# Patient Record
Sex: FEMALE | Race: WHITE | NOT HISPANIC OR LATINO | Employment: OTHER | URBAN - METROPOLITAN AREA
[De-identification: names, ages, dates, MRNs, and addresses within clinical notes are randomized per-mention and may not be internally consistent; named-entity substitution may affect disease eponyms.]

---

## 2017-03-06 ENCOUNTER — APPOINTMENT (OUTPATIENT)
Dept: LAB | Facility: CLINIC | Age: 72
End: 2017-03-06
Payer: MEDICARE

## 2017-03-06 DIAGNOSIS — C50.411 MALIGNANT NEOPLASM OF UPPER-OUTER QUADRANT OF RIGHT FEMALE BREAST (HCC): ICD-10-CM

## 2017-03-06 LAB
ALBUMIN SERPL BCP-MCNC: 3.9 G/DL (ref 3.5–5)
ALP SERPL-CCNC: 136 U/L (ref 46–116)
ALT SERPL W P-5'-P-CCNC: 28 U/L (ref 12–78)
ANION GAP SERPL CALCULATED.3IONS-SCNC: 6 MMOL/L (ref 4–13)
AST SERPL W P-5'-P-CCNC: 14 U/L (ref 5–45)
BASOPHILS # BLD AUTO: 0.04 THOUSANDS/ΜL (ref 0–0.1)
BASOPHILS NFR BLD AUTO: 1 % (ref 0–1)
BILIRUB SERPL-MCNC: 0.44 MG/DL (ref 0.2–1)
BUN SERPL-MCNC: 15 MG/DL (ref 5–25)
CALCIUM SERPL-MCNC: 10 MG/DL (ref 8.3–10.1)
CANCER AG27-29 SERPL-ACNC: 16.8 U/ML (ref 0–42.3)
CHLORIDE SERPL-SCNC: 103 MMOL/L (ref 100–108)
CO2 SERPL-SCNC: 31 MMOL/L (ref 21–32)
CREAT SERPL-MCNC: 0.71 MG/DL (ref 0.6–1.3)
EOSINOPHIL # BLD AUTO: 0.07 THOUSAND/ΜL (ref 0–0.61)
EOSINOPHIL NFR BLD AUTO: 1 % (ref 0–6)
ERYTHROCYTE [DISTWIDTH] IN BLOOD BY AUTOMATED COUNT: 14.2 % (ref 11.6–15.1)
GFR SERPL CREATININE-BSD FRML MDRD: >60 ML/MIN/1.73SQ M
GLUCOSE SERPL-MCNC: 96 MG/DL (ref 65–140)
HCT VFR BLD AUTO: 41.2 % (ref 34.8–46.1)
HGB BLD-MCNC: 14.1 G/DL (ref 11.5–15.4)
LYMPHOCYTES # BLD AUTO: 2.51 THOUSANDS/ΜL (ref 0.6–4.47)
LYMPHOCYTES NFR BLD AUTO: 45 % (ref 14–44)
MCH RBC QN AUTO: 31.4 PG (ref 26.8–34.3)
MCHC RBC AUTO-ENTMCNC: 34.2 G/DL (ref 31.4–37.4)
MCV RBC AUTO: 92 FL (ref 82–98)
MONOCYTES # BLD AUTO: 0.53 THOUSAND/ΜL (ref 0.17–1.22)
MONOCYTES NFR BLD AUTO: 10 % (ref 4–12)
NEUTROPHILS # BLD AUTO: 2.39 THOUSANDS/ΜL (ref 1.85–7.62)
NEUTS SEG NFR BLD AUTO: 43 % (ref 43–75)
NRBC BLD AUTO-RTO: 0 /100 WBCS
PLATELET # BLD AUTO: 267 THOUSANDS/UL (ref 149–390)
PMV BLD AUTO: 12.1 FL (ref 8.9–12.7)
POTASSIUM SERPL-SCNC: 4.4 MMOL/L (ref 3.5–5.3)
PROT SERPL-MCNC: 7.6 G/DL (ref 6.4–8.2)
RBC # BLD AUTO: 4.49 MILLION/UL (ref 3.81–5.12)
SODIUM SERPL-SCNC: 140 MMOL/L (ref 136–145)
WBC # BLD AUTO: 5.55 THOUSAND/UL (ref 4.31–10.16)

## 2017-03-06 PROCEDURE — 85025 COMPLETE CBC W/AUTO DIFF WBC: CPT

## 2017-03-06 PROCEDURE — 86300 IMMUNOASSAY TUMOR CA 15-3: CPT

## 2017-03-06 PROCEDURE — 80053 COMPREHEN METABOLIC PANEL: CPT

## 2017-03-06 PROCEDURE — 36415 COLL VENOUS BLD VENIPUNCTURE: CPT

## 2017-03-16 ENCOUNTER — ALLSCRIPTS OFFICE VISIT (OUTPATIENT)
Dept: OTHER | Facility: OTHER | Age: 72
End: 2017-03-16

## 2017-03-21 ENCOUNTER — ALLSCRIPTS OFFICE VISIT (OUTPATIENT)
Dept: OTHER | Facility: OTHER | Age: 72
End: 2017-03-21

## 2017-03-21 ENCOUNTER — TRANSCRIBE ORDERS (OUTPATIENT)
Dept: ADMINISTRATIVE | Facility: HOSPITAL | Age: 72
End: 2017-03-21

## 2017-03-21 DIAGNOSIS — C50.411 MALIGNANT NEOPLASM OF UPPER-OUTER QUADRANT OF RIGHT FEMALE BREAST (HCC): Primary | ICD-10-CM

## 2017-06-28 ENCOUNTER — HOSPITAL ENCOUNTER (OUTPATIENT)
Dept: MAMMOGRAPHY | Facility: CLINIC | Age: 72
Discharge: HOME | End: 2017-06-28
Payer: MEDICARE

## 2017-06-28 DIAGNOSIS — C50.411 MALIGNANT NEOPLASM OF UPPER-OUTER QUADRANT OF RIGHT FEMALE BREAST (HCC): ICD-10-CM

## 2017-06-28 PROCEDURE — G0279 TOMOSYNTHESIS, MAMMO: HCPCS

## 2017-06-28 PROCEDURE — G0206 DX MAMMO INCL CAD UNI: HCPCS

## 2017-09-06 DIAGNOSIS — C50.411 MALIGNANT NEOPLASM OF UPPER-OUTER QUADRANT OF RIGHT FEMALE BREAST (HCC): ICD-10-CM

## 2017-09-08 ENCOUNTER — APPOINTMENT (OUTPATIENT)
Dept: LAB | Facility: CLINIC | Age: 72
End: 2017-09-08
Payer: MEDICARE

## 2017-09-08 DIAGNOSIS — C50.411 MALIGNANT NEOPLASM OF UPPER-OUTER QUADRANT OF RIGHT FEMALE BREAST (HCC): ICD-10-CM

## 2017-09-08 LAB
ALBUMIN SERPL BCP-MCNC: 3.9 G/DL (ref 3.5–5)
ALP SERPL-CCNC: 107 U/L (ref 46–116)
ALT SERPL W P-5'-P-CCNC: 26 U/L (ref 12–78)
ANION GAP SERPL CALCULATED.3IONS-SCNC: 7 MMOL/L (ref 4–13)
AST SERPL W P-5'-P-CCNC: 17 U/L (ref 5–45)
BASOPHILS # BLD AUTO: 0.03 THOUSANDS/ΜL (ref 0–0.1)
BASOPHILS NFR BLD AUTO: 1 % (ref 0–1)
BILIRUB SERPL-MCNC: 0.52 MG/DL (ref 0.2–1)
BUN SERPL-MCNC: 20 MG/DL (ref 5–25)
CALCIUM SERPL-MCNC: 9.9 MG/DL (ref 8.3–10.1)
CANCER AG27-29 SERPL-ACNC: 19.9 U/ML (ref 0–42.3)
CHLORIDE SERPL-SCNC: 105 MMOL/L (ref 100–108)
CO2 SERPL-SCNC: 28 MMOL/L (ref 21–32)
CREAT SERPL-MCNC: 0.73 MG/DL (ref 0.6–1.3)
EOSINOPHIL # BLD AUTO: 0.06 THOUSAND/ΜL (ref 0–0.61)
EOSINOPHIL NFR BLD AUTO: 1 % (ref 0–6)
ERYTHROCYTE [DISTWIDTH] IN BLOOD BY AUTOMATED COUNT: 14.8 % (ref 11.6–15.1)
GFR SERPL CREATININE-BSD FRML MDRD: 83 ML/MIN/1.73SQ M
GLUCOSE P FAST SERPL-MCNC: 91 MG/DL (ref 65–99)
HCT VFR BLD AUTO: 41.7 % (ref 34.8–46.1)
HGB BLD-MCNC: 13.9 G/DL (ref 11.5–15.4)
LYMPHOCYTES # BLD AUTO: 2.4 THOUSANDS/ΜL (ref 0.6–4.47)
LYMPHOCYTES NFR BLD AUTO: 39 % (ref 14–44)
MCH RBC QN AUTO: 31 PG (ref 26.8–34.3)
MCHC RBC AUTO-ENTMCNC: 33.3 G/DL (ref 31.4–37.4)
MCV RBC AUTO: 93 FL (ref 82–98)
MONOCYTES # BLD AUTO: 0.48 THOUSAND/ΜL (ref 0.17–1.22)
MONOCYTES NFR BLD AUTO: 8 % (ref 4–12)
NEUTROPHILS # BLD AUTO: 3.18 THOUSANDS/ΜL (ref 1.85–7.62)
NEUTS SEG NFR BLD AUTO: 51 % (ref 43–75)
NRBC BLD AUTO-RTO: 0 /100 WBCS
PLATELET # BLD AUTO: 215 THOUSANDS/UL (ref 149–390)
PMV BLD AUTO: 11.7 FL (ref 8.9–12.7)
POTASSIUM SERPL-SCNC: 4.5 MMOL/L (ref 3.5–5.3)
PROT SERPL-MCNC: 7.7 G/DL (ref 6.4–8.2)
RBC # BLD AUTO: 4.49 MILLION/UL (ref 3.81–5.12)
SODIUM SERPL-SCNC: 140 MMOL/L (ref 136–145)
WBC # BLD AUTO: 6.16 THOUSAND/UL (ref 4.31–10.16)

## 2017-09-08 PROCEDURE — 80053 COMPREHEN METABOLIC PANEL: CPT

## 2017-09-08 PROCEDURE — 85025 COMPLETE CBC W/AUTO DIFF WBC: CPT

## 2017-09-08 PROCEDURE — 36415 COLL VENOUS BLD VENIPUNCTURE: CPT

## 2017-09-08 PROCEDURE — 86300 IMMUNOASSAY TUMOR CA 15-3: CPT

## 2017-09-18 ENCOUNTER — ALLSCRIPTS OFFICE VISIT (OUTPATIENT)
Dept: OTHER | Facility: OTHER | Age: 72
End: 2017-09-18

## 2017-09-19 ENCOUNTER — GENERIC CONVERSION - ENCOUNTER (OUTPATIENT)
Dept: OTHER | Facility: OTHER | Age: 72
End: 2017-09-19

## 2018-01-12 VITALS
OXYGEN SATURATION: 96 % | DIASTOLIC BLOOD PRESSURE: 74 MMHG | SYSTOLIC BLOOD PRESSURE: 152 MMHG | WEIGHT: 156.38 LBS | TEMPERATURE: 98.5 F | RESPIRATION RATE: 16 BRPM | BODY MASS INDEX: 24.54 KG/M2 | HEIGHT: 67 IN | HEART RATE: 89 BPM

## 2018-01-13 VITALS
HEIGHT: 67 IN | RESPIRATION RATE: 16 BRPM | WEIGHT: 160 LBS | BODY MASS INDEX: 25.11 KG/M2 | HEART RATE: 82 BPM | TEMPERATURE: 97.3 F | SYSTOLIC BLOOD PRESSURE: 118 MMHG | DIASTOLIC BLOOD PRESSURE: 84 MMHG | OXYGEN SATURATION: 98 %

## 2018-01-15 VITALS
HEIGHT: 67 IN | TEMPERATURE: 99.4 F | DIASTOLIC BLOOD PRESSURE: 78 MMHG | RESPIRATION RATE: 14 BRPM | BODY MASS INDEX: 24.99 KG/M2 | HEART RATE: 100 BPM | OXYGEN SATURATION: 98 % | SYSTOLIC BLOOD PRESSURE: 120 MMHG | WEIGHT: 159.25 LBS

## 2018-01-22 VITALS
SYSTOLIC BLOOD PRESSURE: 138 MMHG | HEART RATE: 79 BPM | TEMPERATURE: 97.6 F | HEIGHT: 67 IN | OXYGEN SATURATION: 98 % | WEIGHT: 156.38 LBS | BODY MASS INDEX: 24.54 KG/M2 | DIASTOLIC BLOOD PRESSURE: 82 MMHG | RESPIRATION RATE: 16 BRPM

## 2018-03-05 ENCOUNTER — APPOINTMENT (OUTPATIENT)
Dept: LAB | Facility: CLINIC | Age: 73
End: 2018-03-05
Payer: MEDICARE

## 2018-03-05 DIAGNOSIS — C50.411 MALIGNANT NEOPLASM OF UPPER-OUTER QUADRANT OF RIGHT FEMALE BREAST (HCC): ICD-10-CM

## 2018-03-05 LAB
ALBUMIN SERPL BCP-MCNC: 4.1 G/DL (ref 3.5–5)
ALP SERPL-CCNC: 121 U/L (ref 46–116)
ALT SERPL W P-5'-P-CCNC: 23 U/L (ref 12–78)
ANION GAP SERPL CALCULATED.3IONS-SCNC: 8 MMOL/L (ref 4–13)
AST SERPL W P-5'-P-CCNC: 12 U/L (ref 5–45)
BASOPHILS # BLD AUTO: 0.03 THOUSANDS/ΜL (ref 0–0.1)
BASOPHILS NFR BLD AUTO: 1 % (ref 0–1)
BILIRUB SERPL-MCNC: 0.51 MG/DL (ref 0.2–1)
BUN SERPL-MCNC: 21 MG/DL (ref 5–25)
CALCIUM ALBUM COR SERPL-MCNC: 10.1 MG/DL (ref 8.3–10.1)
CALCIUM SERPL-MCNC: 10.2 MG/DL (ref 8.3–10.1)
CANCER AG27-29 SERPL-ACNC: 23.5 U/ML (ref 0–42.3)
CHLORIDE SERPL-SCNC: 103 MMOL/L (ref 100–108)
CO2 SERPL-SCNC: 29 MMOL/L (ref 21–32)
CREAT SERPL-MCNC: 0.69 MG/DL (ref 0.6–1.3)
EOSINOPHIL # BLD AUTO: 0.16 THOUSAND/ΜL (ref 0–0.61)
EOSINOPHIL NFR BLD AUTO: 3 % (ref 0–6)
ERYTHROCYTE [DISTWIDTH] IN BLOOD BY AUTOMATED COUNT: 14.5 % (ref 11.6–15.1)
GFR SERPL CREATININE-BSD FRML MDRD: 88 ML/MIN/1.73SQ M
GLUCOSE P FAST SERPL-MCNC: 92 MG/DL (ref 65–99)
HCT VFR BLD AUTO: 42.9 % (ref 34.8–46.1)
HGB BLD-MCNC: 14.6 G/DL (ref 11.5–15.4)
LYMPHOCYTES # BLD AUTO: 2.4 THOUSANDS/ΜL (ref 0.6–4.47)
LYMPHOCYTES NFR BLD AUTO: 47 % (ref 14–44)
MCH RBC QN AUTO: 31.5 PG (ref 26.8–34.3)
MCHC RBC AUTO-ENTMCNC: 34 G/DL (ref 31.4–37.4)
MCV RBC AUTO: 93 FL (ref 82–98)
MONOCYTES # BLD AUTO: 0.5 THOUSAND/ΜL (ref 0.17–1.22)
MONOCYTES NFR BLD AUTO: 10 % (ref 4–12)
NEUTROPHILS # BLD AUTO: 1.96 THOUSANDS/ΜL (ref 1.85–7.62)
NEUTS SEG NFR BLD AUTO: 39 % (ref 43–75)
NRBC BLD AUTO-RTO: 0 /100 WBCS
PLATELET # BLD AUTO: 209 THOUSANDS/UL (ref 149–390)
PMV BLD AUTO: 12.2 FL (ref 8.9–12.7)
POTASSIUM SERPL-SCNC: 4 MMOL/L (ref 3.5–5.3)
PROT SERPL-MCNC: 8.2 G/DL (ref 6.4–8.2)
RBC # BLD AUTO: 4.64 MILLION/UL (ref 3.81–5.12)
SODIUM SERPL-SCNC: 140 MMOL/L (ref 136–145)
WBC # BLD AUTO: 5.05 THOUSAND/UL (ref 4.31–10.16)

## 2018-03-05 PROCEDURE — 86300 IMMUNOASSAY TUMOR CA 15-3: CPT

## 2018-03-05 PROCEDURE — 85025 COMPLETE CBC W/AUTO DIFF WBC: CPT

## 2018-03-05 PROCEDURE — 80053 COMPREHEN METABOLIC PANEL: CPT

## 2018-03-05 PROCEDURE — 36415 COLL VENOUS BLD VENIPUNCTURE: CPT

## 2018-03-16 ENCOUNTER — OFFICE VISIT (OUTPATIENT)
Dept: HEMATOLOGY ONCOLOGY | Facility: CLINIC | Age: 73
End: 2018-03-16
Payer: MEDICARE

## 2018-03-16 VITALS
RESPIRATION RATE: 16 BRPM | WEIGHT: 161 LBS | TEMPERATURE: 97.6 F | SYSTOLIC BLOOD PRESSURE: 148 MMHG | BODY MASS INDEX: 25.27 KG/M2 | HEIGHT: 67 IN | HEART RATE: 90 BPM | OXYGEN SATURATION: 97 % | DIASTOLIC BLOOD PRESSURE: 74 MMHG

## 2018-03-16 DIAGNOSIS — C50.411 MALIGNANT NEOPLASM OF UPPER-OUTER QUADRANT OF RIGHT BREAST IN FEMALE, ESTROGEN RECEPTOR POSITIVE (HCC): Primary | ICD-10-CM

## 2018-03-16 DIAGNOSIS — M81.8 OSTEOPOROSIS DUE TO AROMATASE INHIBITOR: ICD-10-CM

## 2018-03-16 DIAGNOSIS — T38.6X5A OSTEOPOROSIS DUE TO AROMATASE INHIBITOR: ICD-10-CM

## 2018-03-16 DIAGNOSIS — Z17.0 MALIGNANT NEOPLASM OF UPPER-OUTER QUADRANT OF RIGHT BREAST IN FEMALE, ESTROGEN RECEPTOR POSITIVE (HCC): Primary | ICD-10-CM

## 2018-03-16 PROCEDURE — 99214 OFFICE O/P EST MOD 30 MIN: CPT | Performed by: INTERNAL MEDICINE

## 2018-03-16 RX ORDER — ACETAMINOPHEN 160 MG
2 TABLET,DISINTEGRATING ORAL
COMMUNITY

## 2018-03-16 RX ORDER — LEVOTHYROXINE SODIUM 0.03 MG/1
TABLET ORAL
COMMUNITY

## 2018-03-16 RX ORDER — METOPROLOL SUCCINATE 50 MG/1
50 TABLET, EXTENDED RELEASE ORAL
COMMUNITY

## 2018-03-16 RX ORDER — ATORVASTATIN CALCIUM 10 MG/1
1 TABLET, FILM COATED ORAL DAILY
COMMUNITY

## 2018-03-16 RX ORDER — LETROZOLE 2.5 MG/1
2.5 TABLET, FILM COATED ORAL DAILY
Qty: 90 TABLET | Refills: 3 | Status: SHIPPED | OUTPATIENT
Start: 2018-03-16 | End: 2019-04-18 | Stop reason: SDUPTHER

## 2018-03-16 RX ORDER — LETROZOLE 2.5 MG/1
1 TABLET, FILM COATED ORAL DAILY
COMMUNITY
End: 2018-03-16 | Stop reason: SDUPTHER

## 2018-03-16 RX ORDER — ACETAMINOPHEN, ASPIRIN AND CAFFEINE 250; 250; 65 MG/1; MG/1; MG/1
TABLET, FILM COATED ORAL
COMMUNITY

## 2018-03-16 RX ORDER — DULOXETIN HYDROCHLORIDE 30 MG/1
CAPSULE, DELAYED RELEASE ORAL
COMMUNITY
Start: 2017-06-15

## 2018-03-16 NOTE — PROGRESS NOTES
HPI:   Jaziel Preciado is a 71 y o  female with breast cancer and patient is on adjuvant letrozole     She is tolerating letrozole without much problem  Has minimal musculoskeletal symptoms, some thinning of the hair and occasionally hot flashes  In 2013 patient was diagnosed to have hormone receptor positive, HER 2-negative, grade 2, stage IIa, T2 N0 right breast cancer  He had right mastectomy and sentinel lymph node sampling  Oncotype score was low at 12  In September 2013 patient was started on adjuvant Femara  She has minor musculoskeletal symptoms and some thinning of hair secondary to Femara  She has borderline osteoporosis and she will go for a follow-up DEXA scan  She is taking vitamin D and she stays active  I did mention about possibility of Prolia  Previously in 2011 she had melanoma of right ear skin and she had surgery and sentinel lymph node sampling and no adjuvant therapy and there has not been any recurrence  She had Mohs surgery for basal cell skin cancer and squamous cell skin cancer   She had 5-FU cream  She follows with her dermatologist    In January 2017 she had right cataract surgery    Current Outpatient Prescriptions:     aspirin-acetaminophen-caffeine ([address] [place] [address]) 250-250-65 MG per tablet, Take by mouth, Disp: , Rfl:     atorvastatin (LIPITOR) 10 mg tablet, Take 1 tablet by mouth daily, Disp: , Rfl:     Cholecalciferol (VITAMIN D3) 2000 units capsule, Take 2 tablets by mouth, Disp: , Rfl:     DULoxetine (CYMBALTA) 30 mg delayed release capsule, TAKE ONE CAPSULE BY MOUTH DAILY FOR MOOD, Disp: , Rfl:     letrozole (FEMARA) 2 5 mg tablet, Take 1 tablet by mouth daily, Disp: , Rfl:     levothyroxine 25 mcg tablet, Take by mouth, Disp: , Rfl:     metoprolol succinate (TOPROL XL) 25 mg 24 hr tablet, Take by mouth, Disp: , Rfl:     No Known Allergies    ROS:  03/16/18 Reviewed 13 systems:  Presently no headaches, seizures, dizziness, diplopia, dysphagia, hoarseness, chest pain, palpitations, shortness of breath, cough, hemoptysis, abdominal pain, nausea, vomiting, change in bowel habits, melena, hematuria, fever, chills, bleeding, bone pains, skin rash, weight loss, numbness, tiredness , weakness, claudication and gait problem  No frequent infections  Not unusually sensitive to heat or cold  No swelling of the ankles  No swollen glands  Patient is anxious  No GYN symptoms  Other symptoms are in HPI       /74 (BP Location: Left arm, Cuff Size: Adult)   Pulse 90   Temp 97 6 °F (36 4 °C) (Tympanic)   Resp 16   Ht 5' 6 75" (1 695 m)   Wt 73 kg (161 lb)   SpO2 97%   BMI 25 41 kg/m²     Physical Exam:  Alert, oriented, not in distress, no icterus, no oral thrush, no palpable neck mass, clear lung fields, regular heart rate, right mastectomy scar, no lymphedema, no palpable mass left breast, abdomen  soft and non tender, no palpable abdominal mass, no ascites, no edema of ankles, no calf tenderness, no focal neurological deficit, no skin rash, no palpable lymphadenopathy, good arterial pulses, no clubbing  Patient is anxious    Performance status 0    IMAGING:  No orders to display       LABS:    Results for orders placed or performed in visit on 03/05/18   Cancer antigen 27 29   Result Value Ref Range    CA 27 29 23 5 0 0 - 42 3 U/mL   CBC and differential   Result Value Ref Range    WBC 5 05 4 31 - 10 16 Thousand/uL    RBC 4 64 3 81 - 5 12 Million/uL    Hemoglobin 14 6 11 5 - 15 4 g/dL    Hematocrit 42 9 34 8 - 46 1 %    MCV 93 82 - 98 fL    MCH 31 5 26 8 - 34 3 pg    MCHC 34 0 31 4 - 37 4 g/dL    RDW 14 5 11 6 - 15 1 %    MPV 12 2 8 9 - 12 7 fL    Platelets 381 601 - 910 Thousands/uL    nRBC 0 /100 WBCs    Neutrophils Relative 39 (L) 43 - 75 %    Lymphocytes Relative 47 (H) 14 - 44 %    Monocytes Relative 10 4 - 12 %    Eosinophils Relative 3 0 - 6 %    Basophils Relative 1 0 - 1 %    Neutrophils Absolute 1 96 1 85 - 7 62 Thousands/µL    Lymphocytes Absolute 2 40 0 60 - 4 47 Thousands/µL    Monocytes Absolute 0 50 0 17 - 1 22 Thousand/µL    Eosinophils Absolute 0 16 0 00 - 0 61 Thousand/µL    Basophils Absolute 0 03 0 00 - 0 10 Thousands/µL   Comprehensive metabolic panel   Result Value Ref Range    Sodium 140 136 - 145 mmol/L    Potassium 4 0 3 5 - 5 3 mmol/L    Chloride 103 100 - 108 mmol/L    CO2 29 21 - 32 mmol/L    Anion Gap 8 4 - 13 mmol/L    BUN 21 5 - 25 mg/dL    Creatinine 0 69 0 60 - 1 30 mg/dL    Glucose, Fasting 92 65 - 99 mg/dL    Calcium 10 2 (H) 8 3 - 10 1 mg/dL    Corrected Calcium 10 1 8 3 - 10 1 mg/dL    AST 12 5 - 45 U/L    ALT 23 12 - 78 U/L    Alkaline Phosphatase 121 (H) 46 - 116 U/L    Total Protein 8 2 6 4 - 8 2 g/dL    Albumin 4 1 3 5 - 5 0 g/dL    Total Bilirubin 0 51 0 20 - 1 00 mg/dL    eGFR 88 ml/min/1 73sq m           Reviewed and discussed with patient  Assessment and plan:  Michela Oliva is a 71 y o  female with breast cancer and patient is on adjuvant letrozole     She is tolerating letrozole without much problem  Has minimal musculoskeletal symptoms, some thinning of the hair and occasionally hot flashes  In 2013 patient was diagnosed to have hormone receptor positive, HER 2-negative, grade 2, stage IIa, T2 N0 right breast cancer  He had right mastectomy and sentinel lymph node sampling  Oncotype score was low at 12  In September 2013 patient was started on adjuvant Femara  She has minor musculoskeletal symptoms and some thinning of hair secondary to Femara  She has borderline osteoporosis and she will go for a follow-up DEXA scan  She is taking vitamin D and she stays active  I did mention about possibility of Prolia  Previously in 2011 she had melanoma of right ear skin and she had surgery and sentinel lymph node sampling and no adjuvant therapy and there has not been any recurrence  She had Mohs surgery for basal cell skin cancer and squamous cell skin cancer   She had 5-FU cream  She follows with her dermatologist   Physical examination and test results are as recorded and discussed  She remains in remission from breast cancer  She stays on letrozole  She has appointment with Dr Yolanda Carias next week because she felt sensitive nipple couple weeks ago and something on the outer side of left breast   She wanted me to examine her left breast and I did not feel any mass  Her mammography is scheduled in June  I could send her for an ultrasound of left breast   She wants to discuss that with Dr Yolanda Carias next week, patient's breast surgeon  Condition and plan discussed  Questions answered  Discussed the importance of self-breast examination, eating healthy foods and exercises as tolerated and also health screening tests          Patient voiced understanding and agreement in the discussion  Counseling / Coordination of Care   Greater than 50% of total time was spent with the patient and / or family counseling and / or coordination of care

## 2018-03-16 NOTE — LETTER
March 16, 2018     Mark Anthony Noriega, [address] [address]  [address]  [place] [address]    Patient: Branden Pinon   YOB: 1945   Date of Visit: 3/16/2018       Dear Dr Valencia Porfirio: Thank you for referring Randa Manriquez to me for evaluation  Below are my notes for this consultation  If you have questions, please do not hesitate to call me  I look forward to following your patient along with you  Sincerely,        Elba Chisholm MD        CC: No Recipients  Elba Chisholm MD  3/16/2018 12:43 PM  Sign at close encounter    HPI:   Branden Pinon is a 71 y o  female with breast cancer and patient is on adjuvant letrozole     She is tolerating letrozole without much problem  Has minimal musculoskeletal symptoms, some thinning of the hair and occasionally hot flashes  In 2013 patient was diagnosed to have hormone receptor positive, HER 2-negative, grade 2, stage IIa, T2 N0 right breast cancer  He had right mastectomy and sentinel lymph node sampling  Oncotype score was low at 12  In September 2013 patient was started on adjuvant Femara  She has minor musculoskeletal symptoms and some thinning of hair secondary to Femara  She has borderline osteoporosis and she will go for a follow-up DEXA scan  She is taking vitamin D and she stays active  I did mention about possibility of Prolia  Previously in 2011 she had melanoma of right ear skin and she had surgery and sentinel lymph node sampling and no adjuvant therapy and there has not been any recurrence  She had Mohs surgery for basal cell skin cancer and squamous cell skin cancer   She had 5-FU cream  She follows with her dermatologist    In January 2017 she had right cataract surgery    Current Outpatient Prescriptions:     aspirin-acetaminophen-caffeine ([address] [place] [address]) 250-250-65 MG per tablet, Take by mouth, Disp: , Rfl:     atorvastatin (LIPITOR) 10 mg tablet, Take 1 tablet by mouth daily, Disp: , Rfl:     Cholecalciferol (VITAMIN D3) 2000 units capsule, Take 2 tablets by mouth, Disp: , Rfl:     DULoxetine (CYMBALTA) 30 mg delayed release capsule, TAKE ONE CAPSULE BY MOUTH DAILY FOR MOOD, Disp: , Rfl:     letrozole (FEMARA) 2 5 mg tablet, Take 1 tablet by mouth daily, Disp: , Rfl:     levothyroxine 25 mcg tablet, Take by mouth, Disp: , Rfl:     metoprolol succinate (TOPROL XL) 25 mg 24 hr tablet, Take by mouth, Disp: , Rfl:     No Known Allergies    ROS:  03/16/18 Reviewed 13 systems:  Presently no headaches, seizures, dizziness, diplopia, dysphagia, hoarseness, chest pain, palpitations, shortness of breath, cough, hemoptysis, abdominal pain, nausea, vomiting, change in bowel habits, melena, hematuria, fever, chills, bleeding, bone pains, skin rash, weight loss, numbness, tiredness , weakness, claudication and gait problem  No frequent infections  Not unusually sensitive to heat or cold  No swelling of the ankles  No swollen glands  Patient is anxious  No GYN symptoms  Other symptoms are in HPI       /74 (BP Location: Left arm, Cuff Size: Adult)   Pulse 90   Temp 97 6 °F (36 4 °C) (Tympanic)   Resp 16   Ht 5' 6 75" (1 695 m)   Wt 73 kg (161 lb)   SpO2 97%   BMI 25 41 kg/m²      Physical Exam:  Alert, oriented, not in distress, no icterus, no oral thrush, no palpable neck mass, clear lung fields, regular heart rate, right mastectomy scar, no lymphedema, no palpable mass left breast, abdomen  soft and non tender, no palpable abdominal mass, no ascites, no edema of ankles, no calf tenderness, no focal neurological deficit, no skin rash, no palpable lymphadenopathy, good arterial pulses, no clubbing  Patient is anxious    Performance status 0    IMAGING:  No orders to display       LABS:    Results for orders placed or performed in visit on 03/05/18   Cancer antigen 27 29   Result Value Ref Range    CA 27 29 23 5 0 0 - 42 3 U/mL   CBC and differential   Result Value Ref Range    WBC 5 05 4 31 - 10 16 Thousand/uL    RBC 4 64 3 81 - 5 12 Million/uL    Hemoglobin 14 6 11 5 - 15 4 g/dL    Hematocrit 42 9 34 8 - 46 1 %    MCV 93 82 - 98 fL    MCH 31 5 26 8 - 34 3 pg    MCHC 34 0 31 4 - 37 4 g/dL    RDW 14 5 11 6 - 15 1 %    MPV 12 2 8 9 - 12 7 fL    Platelets 712 963 - 337 Thousands/uL    nRBC 0 /100 WBCs    Neutrophils Relative 39 (L) 43 - 75 %    Lymphocytes Relative 47 (H) 14 - 44 %    Monocytes Relative 10 4 - 12 %    Eosinophils Relative 3 0 - 6 %    Basophils Relative 1 0 - 1 %    Neutrophils Absolute 1 96 1 85 - 7 62 Thousands/µL    Lymphocytes Absolute 2 40 0 60 - 4 47 Thousands/µL    Monocytes Absolute 0 50 0 17 - 1 22 Thousand/µL    Eosinophils Absolute 0 16 0 00 - 0 61 Thousand/µL    Basophils Absolute 0 03 0 00 - 0 10 Thousands/µL   Comprehensive metabolic panel   Result Value Ref Range    Sodium 140 136 - 145 mmol/L    Potassium 4 0 3 5 - 5 3 mmol/L    Chloride 103 100 - 108 mmol/L    CO2 29 21 - 32 mmol/L    Anion Gap 8 4 - 13 mmol/L    BUN 21 5 - 25 mg/dL    Creatinine 0 69 0 60 - 1 30 mg/dL    Glucose, Fasting 92 65 - 99 mg/dL    Calcium 10 2 (H) 8 3 - 10 1 mg/dL    Corrected Calcium 10 1 8 3 - 10 1 mg/dL    AST 12 5 - 45 U/L    ALT 23 12 - 78 U/L    Alkaline Phosphatase 121 (H) 46 - 116 U/L    Total Protein 8 2 6 4 - 8 2 g/dL    Albumin 4 1 3 5 - 5 0 g/dL    Total Bilirubin 0 51 0 20 - 1 00 mg/dL    eGFR 88 ml/min/1 73sq m           Reviewed and discussed with patient  Assessment and plan:  Kristy Dial is a 71 y o  female with breast cancer and patient is on adjuvant letrozole     She is tolerating letrozole without much problem  Has minimal musculoskeletal symptoms, some thinning of the hair and occasionally hot flashes  In 2013 patient was diagnosed to have hormone receptor positive, HER 2-negative, grade 2, stage IIa, T2 N0 right breast cancer  He had right mastectomy and sentinel lymph node sampling  Oncotype score was low at 12  In September 2013 patient was started on adjuvant Femara   She has minor musculoskeletal symptoms and some thinning of hair secondary to Femara  She has borderline osteoporosis and she will go for a follow-up DEXA scan  She is taking vitamin D and she stays active  I did mention about possibility of Prolia  Previously in 2011 she had melanoma of right ear skin and she had surgery and sentinel lymph node sampling and no adjuvant therapy and there has not been any recurrence  She had Mohs surgery for basal cell skin cancer and squamous cell skin cancer  She had 5-FU cream  She follows with her dermatologist   Physical examination and test results are as recorded and discussed  She remains in remission from breast cancer  She stays on letrozole  She has appointment with Dr Luis Medina next week because she felt sensitive nipple couple weeks ago and something on the outer side of left breast   She wanted me to examine her left breast and I did not feel any mass  Her mammography is scheduled in June  I could send her for an ultrasound of left breast   She wants to discuss that with Dr Luis Medina next week, patient's breast surgeon  Condition and plan discussed  Questions answered  Discussed the importance of self-breast examination, eating healthy foods and exercises as tolerated and also health screening tests          Patient voiced understanding and agreement in the discussion  Counseling / Coordination of Care   Greater than 50% of total time was spent with the patient and / or family counseling and / or coordination of care

## 2018-03-20 ENCOUNTER — OFFICE VISIT (OUTPATIENT)
Dept: SURGICAL ONCOLOGY | Facility: CLINIC | Age: 73
End: 2018-03-20
Payer: MEDICARE

## 2018-03-20 VITALS
DIASTOLIC BLOOD PRESSURE: 80 MMHG | RESPIRATION RATE: 16 BRPM | BODY MASS INDEX: 25.27 KG/M2 | WEIGHT: 161 LBS | HEIGHT: 67 IN | HEART RATE: 68 BPM | SYSTOLIC BLOOD PRESSURE: 142 MMHG | TEMPERATURE: 97.4 F

## 2018-03-20 DIAGNOSIS — C50.411 MALIGNANT NEOPLASM OF UPPER-OUTER QUADRANT OF RIGHT BREAST IN FEMALE, ESTROGEN RECEPTOR POSITIVE (HCC): Primary | ICD-10-CM

## 2018-03-20 DIAGNOSIS — C43.4 MALIGNANT MELANOMA OF HEAD (HCC): ICD-10-CM

## 2018-03-20 DIAGNOSIS — Z17.0 MALIGNANT NEOPLASM OF UPPER-OUTER QUADRANT OF RIGHT BREAST IN FEMALE, ESTROGEN RECEPTOR POSITIVE (HCC): Primary | ICD-10-CM

## 2018-03-20 PROCEDURE — 99213 OFFICE O/P EST LOW 20 MIN: CPT | Performed by: NURSE PRACTITIONER

## 2018-03-20 NOTE — PROGRESS NOTES
Surgical Oncology Follow Up       [address] [facility]  [facility] [place]  [address] [address]    Humberto Alicia  1945  [id number]  [address] [address] [facility]  [facility] [place]  [address] [address]    Chief Complaint   Patient presents with    Breast Cancer     Pt is here for a six month follow up       Assessment/Plan:  1  Malignant neoplasm of upper-outer quadrant of right breast in female, estrogen receptor positive ([facility] [address] )  - 6 month follow up visit  - Mammo diagnostic left w 3d & cad; Future    2  Malignant melanoma of head ([facility] [address] )  - 6 mo follow up visit         Discussion/Summary: The patient is a 27-year-old female who presents today for a six-month follow-up for right breast cancer diagnosed in July 2013 and a right ear [id number] melanoma diagnosed in January 2011  She has undergone a right mastectomy and sentinel node biopsy in July 2013 and is currently taking Letrozole  She had a left 3D diagnostic mammogram performed on 6/28/17 which was BI-RADS 2  She also had a wide excision of the melanoma and sentinel node biopsy performed in January 2011  She follows with her dermatologist every 6 months  She states that she feels a new "ridge" in her upper out left breast  There are no worrisome findings on physical exam- no dominant masses  She was examined by myself and Dr Anastasiya Silva- her exam is consistent with dense breast tissue  No worrisome findings  We will schedule a left diagnostic mammogram for June 2018 and we will see the patient back in 6 months or sooner if need arises  She was instructed to call with any new concerns or symptoms  All of her questions were answered       History of Present Illness:        Malignant neoplasm of upper-outer quadrant of right female breast ([address] )    6/11/2013 Initial Diagnosis     Malignant neoplasm of upper-outer quadrant of right female breast ([address] )    Right breast biopsy, 10:00  Pleomorphic lobular carcinoma    ER 90-95%  KS 60-65%  HER-2 negative         7/10/2013 Surgery     Right mastectomy, sentinel node biopsy (Dr Adria Borjas)    Invasive lobular carcinoma  Stage IIa- pT2pN0(+i), grade 2         9/2013 -  Hormone Therapy     Letrozole (Dr Elvin Norman)           Malignant melanoma of head ([facility] [address] )    11/9/2010 Initial Diagnosis     Malignant melanoma of head ([facility] [address] )    Right earlobe biopsy  Malignant melanoma  2 3mm  No ulceration  3 mitosis         1/6/2011 Surgery     Wide excision, sentinel node biopsy- right neck (Dr Adria Borjas)    0/8 positive lymph nodes  No residual melanoma on surgical specimen                 -Interval History: Patient presents today for a 6 month follow up visit for right breast cancer and right ear melanoma  She denies headaches, back pain, bone pain, cough, SOB, abdominal pain  She states that in January she had a prickly sensation of her left nipple  At that time, she performed a thorough self breast exam and feels that she feels an abnormality, a "ridge" in her left breast that she did not appreciate before  No other concerns- denies headaches, back pain, bone pain, cough, SOB, abdominal pain  Review of Systems:  Review of Systems   Constitutional: Negative for activity change, appetite change, chills, fatigue, fever and unexpected weight change  HENT: Negative for trouble swallowing  Eyes: Negative for pain, redness and visual disturbance  Respiratory: Negative for cough, shortness of breath and wheezing  Cardiovascular: Negative for chest pain, palpitations and leg swelling  Gastrointestinal: Negative for abdominal pain, constipation, diarrhea, nausea and vomiting  Endocrine: Negative for cold intolerance and heat intolerance  Musculoskeletal: Negative for arthralgias, back pain, gait problem and myalgias  Skin: Negative for color change and rash  Neurological: Negative for dizziness, syncope, light-headedness, numbness and headaches  Hematological: Negative for adenopathy  Psychiatric/Behavioral: Negative for agitation and confusion  All other systems reviewed and are negative  Patient Active Problem List   Diagnosis    Hypercholesteremia    Benign neoplasm of breast    Lymphadenopathy    Malignant neoplasm of upper-outer quadrant of right female breast ([facility] [address] )    Malignant melanoma of head ([facility] [address] )     Past Medical History:   Diagnosis Date    [facility]     Depression     Dyslipidemia     Fibrocystic breast changes     Hypertension      Past Surgical History:   Procedure Laterality Date     SECTION      MASTECTOMY Right     SENTINEL LYMPH NODE BIOPSY      TONSILLECTOMY       Family History   Problem Relation Age of Onset    Pancreatic cancer Father     Breast cancer Maternal Aunt     No Known Problems Mother      Social History     Social History    Marital status: /Civil Union     Spouse name: N/A    Number of children: N/A    Years of education: N/A     Occupational History    Not on file       Social History Main Topics    Smoking status: Never Smoker    Smokeless tobacco: Never Used    Alcohol use No    Drug use: No    Sexual activity: Not on file     Other Topics Concern    Not on file     Social History Narrative    No narrative on file       Current Outpatient Prescriptions:     aspirin-acetaminophen-caffeine ([address] [place] [address]) 250-250-65 MG per tablet, Take by mouth, Disp: , Rfl:     atorvastatin (LIPITOR) 10 mg tablet, Take 1 tablet by mouth daily, Disp: , Rfl:     Cholecalciferol (VITAMIN D3) 2000 units capsule, Take 2 tablets by mouth, Disp: , Rfl:     DULoxetine (CYMBALTA) 30 mg delayed release capsule, TAKE ONE CAPSULE BY MOUTH DAILY FOR MOOD, Disp: , Rfl:     letrozole (FEMARA) 2 5 mg tablet, Take 1 tablet (2 5 mg total) by mouth daily, Disp: 90 tablet, Rfl: 3    levothyroxine 25 mcg tablet, Take by mouth, Disp: , Rfl:     metoprolol succinate (TOPROL XL) 25 mg 24 hr tablet, Take by mouth, Disp: , Rfl:   No Known Allergies  Vitals:    03/20/18 1113   BP: 142/80   Pulse: 68   Resp: 16   Temp: (!) 97 4 °F (36 3 °C)       Physical Exam   Constitutional: She is oriented to person, place, and time  Vital signs are normal  She appears well-developed and well-nourished  No distress  HENT:   Head: Normocephalic and atraumatic  Neck: Normal range of motion  Cardiovascular: Normal rate, regular rhythm and normal heart sounds  Pulmonary/Chest: Effort normal and breath sounds normal    Left breast examined in the sitting and supine position  There are no masses, skin nodules, nipple changes or nipple discharge  No palpable abnormality noted  Right chest wall without masses or skin nodules  There is no bilateral supraclavicular or axillary lymphadenopathy noted  Examined by myself and Dr Marilyn Fournier  Abdominal: Soft  Normal appearance  She exhibits no mass  There is no hepatosplenomegaly  There is no tenderness  Musculoskeletal: Normal range of motion  Lymphadenopathy:     She has no axillary adenopathy  Right: No supraclavicular adenopathy present  Left: No supraclavicular adenopathy present  Neurological: She is alert and oriented to person, place, and time  Skin: Skin is warm, dry and intact  No rash noted  She is not diaphoretic  Right ear excision site is well healed  No skin changes  No cervical lymphadenopathy  Psychiatric: She has a normal mood and affect  Her speech is normal    Vitals reviewed  Advance Care Planning/Advance Directives:  Discussed disease status, cancer treatment plans and/or cancer treatment goals with the patient

## 2018-07-02 ENCOUNTER — HOSPITAL ENCOUNTER (OUTPATIENT)
Dept: MAMMOGRAPHY | Facility: CLINIC | Age: 73
Discharge: HOME | End: 2018-07-02
Payer: MEDICARE

## 2018-07-02 ENCOUNTER — HOSPITAL ENCOUNTER (OUTPATIENT)
Dept: ULTRASOUND IMAGING | Facility: CLINIC | Age: 73
Discharge: HOME | End: 2018-07-02

## 2018-07-02 DIAGNOSIS — Z17.0 MALIGNANT NEOPLASM OF UPPER-OUTER QUADRANT OF RIGHT BREAST IN FEMALE, ESTROGEN RECEPTOR POSITIVE (HCC): ICD-10-CM

## 2018-07-02 DIAGNOSIS — C50.411 MALIGNANT NEOPLASM OF UPPER-OUTER QUADRANT OF RIGHT BREAST IN FEMALE, ESTROGEN RECEPTOR POSITIVE (HCC): ICD-10-CM

## 2018-07-02 DIAGNOSIS — R92.2 INCONCLUSIVE MAMMOGRAPHY: ICD-10-CM

## 2018-07-02 PROCEDURE — 76642 ULTRASOUND BREAST LIMITED: CPT

## 2018-07-02 PROCEDURE — G0279 TOMOSYNTHESIS, MAMMO: HCPCS

## 2018-07-02 PROCEDURE — 77065 DX MAMMO INCL CAD UNI: CPT

## 2018-10-04 ENCOUNTER — TRANSCRIBE ORDERS (OUTPATIENT)
Dept: RADIOLOGY | Facility: CLINIC | Age: 73
End: 2018-10-04

## 2018-10-04 ENCOUNTER — APPOINTMENT (OUTPATIENT)
Dept: RADIOLOGY | Facility: CLINIC | Age: 73
End: 2018-10-04
Payer: MEDICARE

## 2018-10-04 ENCOUNTER — OFFICE VISIT (OUTPATIENT)
Dept: SURGICAL ONCOLOGY | Facility: CLINIC | Age: 73
End: 2018-10-04
Payer: MEDICARE

## 2018-10-04 VITALS
TEMPERATURE: 97.9 F | WEIGHT: 161 LBS | RESPIRATION RATE: 12 BRPM | HEIGHT: 67 IN | HEART RATE: 76 BPM | DIASTOLIC BLOOD PRESSURE: 68 MMHG | BODY MASS INDEX: 25.27 KG/M2 | SYSTOLIC BLOOD PRESSURE: 142 MMHG

## 2018-10-04 DIAGNOSIS — C50.411 MALIGNANT NEOPLASM OF UPPER-OUTER QUADRANT OF RIGHT BREAST IN FEMALE, ESTROGEN RECEPTOR POSITIVE (HCC): Primary | ICD-10-CM

## 2018-10-04 DIAGNOSIS — M95.4 DEFORMITY OF RIB: ICD-10-CM

## 2018-10-04 DIAGNOSIS — C43.4 MALIGNANT MELANOMA OF HEAD (HCC): ICD-10-CM

## 2018-10-04 DIAGNOSIS — Z12.31 VISIT FOR SCREENING MAMMOGRAM: ICD-10-CM

## 2018-10-04 DIAGNOSIS — Z17.0 MALIGNANT NEOPLASM OF UPPER-OUTER QUADRANT OF RIGHT BREAST IN FEMALE, ESTROGEN RECEPTOR POSITIVE (HCC): Primary | ICD-10-CM

## 2018-10-04 PROCEDURE — 99214 OFFICE O/P EST MOD 30 MIN: CPT | Performed by: NURSE PRACTITIONER

## 2018-10-04 PROCEDURE — 71111 X-RAY EXAM RIBS/CHEST4/> VWS: CPT

## 2018-10-04 NOTE — PROGRESS NOTES
Surgical Oncology Follow Up       [address]  [facility]  [address] [address]    Javed Poll  1945  [id number]  [id number] [facility]  [facility] [place]  [address] [address]    Chief Complaint   Patient presents with    Follow-up     6 month follow up    Breast Cancer       Assessment/Plan:  1  Malignant neoplasm of upper-outer quadrant of right breast in female, estrogen receptor positive ([facility] )  -1 year f/u    2  Malignant melanoma of head ([facility] )  -1 year f/u    3  Deformity of rib  - XR ribs bilateral 4+ vw w pa chest; Future    4  Visit for screening mammogram  - Mammo screening left w 3d & cad; Future    Discussion/Summary: The patient is a 16-year-old female who presents today for a six-month follow-up for right breast cancer diagnosed in July 2013 and a right ear [id number] melanoma diagnosed in January 2011  She has undergone a right mastectomy and sentinel node biopsy in July 2013 and is currently taking Letrozole  She had a left 3D diagnostic mammogram and a left breast ultrasound performed on July 2, 2018 which was BI-RADS 1  She had a wide excision of the melanoma and sentinel node biopsy performed in January 2011  She follows with her dermatologist every 6 months- Dr Marily Escalante PA-C  She has no new complaints today- denies headaches, back pain, bone pain, cough, SOB, abdominal pain  On her exam, I first appreciated prominent right lower anterior ribs  Upon further assessment, I feel this is symmetrical and I do not appreciate any gross abnormalities  No hepatomegaly on exam  Given her cancer history, I will obtain x ray of ribs to r/o any underlying issues and I will call the patient with the results  There are no worrisome exam findings   She is now 5 years from her breast cancer diagnosis, so I will order a left mammogram for July 2019 and we will plan to see the patient back in 1 year or sooner if the need arises  She was instructed to call with any new concerns or symptoms  All of her questions were answered  History of Present Illness:        Malignant neoplasm of upper-outer quadrant of right female breast ([facility] )    6/11/2013 Initial Diagnosis     Malignant neoplasm of upper-outer quadrant of right female breast ([facility])    Right breast biopsy, 10:00  Pleomorphic lobular carcinoma    ER 90-95%  WI 60-65%  HER-2 negative         7/10/2013 Surgery     Right mastectomy, sentinel node biopsy (Dr Araceli Orozco)    Invasive lobular carcinoma  Stage IIa- pT2pN0(+i), grade 2         9/2013 -  Hormone Therapy     Letrozole (Dr Tanvi Blake)           Malignant melanoma of head ([facility] )    11/9/2010 Initial Diagnosis     Malignant melanoma of head ([facility] )    Right earlobe biopsy  Malignant melanoma  2 3mm  No ulceration  3 mitosis         1/6/2011 Surgery     Wide excision, sentinel node biopsy- right neck (Dr Araceli Orozco)    0/8 positive lymph nodes  No residual melanoma on surgical specimen                 -Interval History:  Patient presents today for a six-month follow-up visit for right breast cancer and right ear melanoma  She has no new complaints today  She had a left breast mammogram and u/s performed on 7/2/18 which was BIRADS 1  She continues to follow with her dermatologist every 6 mo  Review of Systems:  Review of Systems   Constitutional: Negative for activity change, appetite change, chills, fatigue, fever and unexpected weight change  HENT: Negative for trouble swallowing  Eyes: Negative for pain, redness and visual disturbance  Respiratory: Negative for cough, shortness of breath and wheezing  Cardiovascular: Negative for chest pain, palpitations and leg swelling  Gastrointestinal: Negative for abdominal pain, constipation, diarrhea, nausea and vomiting  Endocrine: Negative for cold intolerance and heat intolerance     Musculoskeletal: Negative for arthralgias, back pain, gait problem and myalgias  Skin: Negative for color change and rash  Neurological: Negative for dizziness, syncope, light-headedness, numbness and headaches  Hematological: Negative for adenopathy  Psychiatric/Behavioral: Negative for agitation and confusion  All other systems reviewed and are negative  Patient Active Problem List   Diagnosis    Hypercholesteremia    Benign neoplasm of breast    Lymphadenopathy    Malignant neoplasm of upper-outer quadrant of right female breast ([facility] )    Malignant melanoma of head ([facility] )     Past Medical History:   Diagnosis Date    [facility] cancer ([facility] )     Depression     Dyslipidemia     Fibrocystic breast changes     Hypertension      Past Surgical History:   Procedure Laterality Date     SECTION      MASTECTOMY Right     SENTINEL LYMPH NODE BIOPSY      TONSILLECTOMY       Family History   Problem Relation Age of Onset    Pancreatic cancer Father     Breast cancer Maternal Aunt     No Known Problems Mother      Social History     Social History    Marital status: /Civil Union     Spouse name: N/A    Number of children: N/A    Years of education: N/A     Occupational History    Not on file       Social History Main Topics    Smoking status: Never Smoker    Smokeless tobacco: Never Used    Alcohol use No    Drug use: No    Sexual activity: Not on file     Other Topics Concern    Not on file     Social History Narrative    No narrative on file       Current Outpatient Prescriptions:     aspirin-acetaminophen-caffeine ([address] [place] [address]) 250-250-65 MG per tablet, Take by mouth, Disp: , Rfl:     atorvastatin (LIPITOR) 10 mg tablet, Take 1 tablet by mouth daily, Disp: , Rfl:     Cholecalciferol (VITAMIN D3) 2000 units capsule, Take 2 tablets by mouth, Disp: , Rfl:     DULoxetine (CYMBALTA) 30 mg delayed release capsule, TAKE ONE CAPSULE BY MOUTH DAILY FOR MOOD, Disp: , Rfl:     letrozole (FEMARA) 2 5 mg tablet, Take 1 tablet (2 5 mg total) by mouth daily, Disp: 90 tablet, Rfl: 3    levothyroxine 25 mcg tablet, Take by mouth, Disp: , Rfl:     metoprolol succinate (TOPROL XL) 50 mg 24 hr tablet, Take 50 mg by mouth  , Disp: , Rfl:   No Known Allergies  Vitals:    10/04/18 1113   BP: 142/68   Pulse: 76   Resp: 12   Temp: 97 9 °F (36 6 °C)       Physical Exam   Constitutional: She is oriented to person, place, and time  Vital signs are normal  She appears well-developed and well-nourished  No distress  HENT:   Head: Normocephalic and atraumatic  Neck: Normal range of motion  Cardiovascular: Normal rate, regular rhythm and normal heart sounds  Pulmonary/Chest: Effort normal and breath sounds normal    Left breast examined in the sitting and supine position  There are no masses, skin nodules, nipple changes or nipple discharge  No palpable abnormality noted  Right chest wall without masses or skin nodules  There is no bilateral supraclavicular or axillary lymphadenopathy noted  Bilateral ribs symmetrical  Appear slightly more prominent at the right lower, anterior level but no gross abnormalities noted  Abdominal: Soft  Normal appearance  She exhibits no mass  There is no hepatosplenomegaly  There is no tenderness  Musculoskeletal: Normal range of motion  Lymphadenopathy:     She has no axillary adenopathy  Right: No supraclavicular adenopathy present  Left: No supraclavicular adenopathy present  Neurological: She is alert and oriented to person, place, and time  Skin: Skin is warm, dry and intact  No rash noted  She is not diaphoretic  Right ear excision site is well healed  No skin changes  No cervical, posterior auricular, pre auricular or parototid lymphadenopathy  Psychiatric: She has a normal mood and affect  Her speech is normal    Vitals reviewed  Advance Care Planning/Advance Directives:  Discussed disease status, cancer treatment plans and/or cancer treatment goals with the patient

## 2018-10-05 ENCOUNTER — APPOINTMENT (OUTPATIENT)
Dept: LAB | Facility: CLINIC | Age: 73
End: 2018-10-05
Payer: MEDICARE

## 2018-10-05 DIAGNOSIS — C50.411 MALIGNANT NEOPLASM OF UPPER-OUTER QUADRANT OF RIGHT BREAST IN FEMALE, ESTROGEN RECEPTOR POSITIVE (HCC): ICD-10-CM

## 2018-10-05 DIAGNOSIS — Z17.0 MALIGNANT NEOPLASM OF UPPER-OUTER QUADRANT OF RIGHT BREAST IN FEMALE, ESTROGEN RECEPTOR POSITIVE (HCC): ICD-10-CM

## 2018-10-05 LAB
ALBUMIN SERPL BCP-MCNC: 3.9 G/DL (ref 3.5–5)
ALP SERPL-CCNC: 121 U/L (ref 46–116)
ALT SERPL W P-5'-P-CCNC: 27 U/L (ref 12–78)
ANION GAP SERPL CALCULATED.3IONS-SCNC: 6 MMOL/L (ref 4–13)
AST SERPL W P-5'-P-CCNC: 11 U/L (ref 5–45)
BASOPHILS # BLD AUTO: 0.04 THOUSANDS/ΜL (ref 0–0.1)
BASOPHILS NFR BLD AUTO: 1 % (ref 0–1)
BILIRUB SERPL-MCNC: 0.93 MG/DL (ref 0.2–1)
BUN SERPL-MCNC: 18 MG/DL (ref 5–25)
CALCIUM SERPL-MCNC: 10 MG/DL (ref 8.3–10.1)
CANCER AG27-29 SERPL-ACNC: 24.6 U/ML (ref 0–42.3)
CHLORIDE SERPL-SCNC: 104 MMOL/L (ref 100–108)
CO2 SERPL-SCNC: 28 MMOL/L (ref 21–32)
CREAT SERPL-MCNC: 0.78 MG/DL (ref 0.6–1.3)
EOSINOPHIL # BLD AUTO: 0.08 THOUSAND/ΜL (ref 0–0.61)
EOSINOPHIL NFR BLD AUTO: 2 % (ref 0–6)
ERYTHROCYTE [DISTWIDTH] IN BLOOD BY AUTOMATED COUNT: 14.5 % (ref 11.6–15.1)
GFR SERPL CREATININE-BSD FRML MDRD: 76 ML/MIN/1.73SQ M
GLUCOSE P FAST SERPL-MCNC: 102 MG/DL (ref 65–99)
HCT VFR BLD AUTO: 41.8 % (ref 34.8–46.1)
HGB BLD-MCNC: 14.2 G/DL (ref 11.5–15.4)
IMM GRANULOCYTES # BLD AUTO: 0.02 THOUSAND/UL (ref 0–0.2)
IMM GRANULOCYTES NFR BLD AUTO: 0 % (ref 0–2)
LYMPHOCYTES # BLD AUTO: 2.19 THOUSANDS/ΜL (ref 0.6–4.47)
LYMPHOCYTES NFR BLD AUTO: 43 % (ref 14–44)
MCH RBC QN AUTO: 32 PG (ref 26.8–34.3)
MCHC RBC AUTO-ENTMCNC: 34 G/DL (ref 31.4–37.4)
MCV RBC AUTO: 94 FL (ref 82–98)
MONOCYTES # BLD AUTO: 0.52 THOUSAND/ΜL (ref 0.17–1.22)
MONOCYTES NFR BLD AUTO: 10 % (ref 4–12)
NEUTROPHILS # BLD AUTO: 2.2 THOUSANDS/ΜL (ref 1.85–7.62)
NEUTS SEG NFR BLD AUTO: 44 % (ref 43–75)
NRBC BLD AUTO-RTO: 0 /100 WBCS
PLATELET # BLD AUTO: 206 THOUSANDS/UL (ref 149–390)
PMV BLD AUTO: 12.3 FL (ref 8.9–12.7)
POTASSIUM SERPL-SCNC: 4.6 MMOL/L (ref 3.5–5.3)
PROT SERPL-MCNC: 7.8 G/DL (ref 6.4–8.2)
RBC # BLD AUTO: 4.44 MILLION/UL (ref 3.81–5.12)
SODIUM SERPL-SCNC: 138 MMOL/L (ref 136–145)
WBC # BLD AUTO: 5.05 THOUSAND/UL (ref 4.31–10.16)

## 2018-10-05 PROCEDURE — 85025 COMPLETE CBC W/AUTO DIFF WBC: CPT

## 2018-10-05 PROCEDURE — 86300 IMMUNOASSAY TUMOR CA 15-3: CPT

## 2018-10-05 PROCEDURE — 80053 COMPREHEN METABOLIC PANEL: CPT

## 2018-10-05 PROCEDURE — 36415 COLL VENOUS BLD VENIPUNCTURE: CPT

## 2018-10-18 ENCOUNTER — OFFICE VISIT (OUTPATIENT)
Dept: HEMATOLOGY ONCOLOGY | Facility: CLINIC | Age: 73
End: 2018-10-18
Payer: MEDICARE

## 2018-10-18 VITALS
TEMPERATURE: 97.5 F | WEIGHT: 161.4 LBS | HEIGHT: 66 IN | SYSTOLIC BLOOD PRESSURE: 132 MMHG | BODY MASS INDEX: 25.94 KG/M2 | HEART RATE: 77 BPM | OXYGEN SATURATION: 97 % | DIASTOLIC BLOOD PRESSURE: 84 MMHG | RESPIRATION RATE: 18 BRPM

## 2018-10-18 DIAGNOSIS — Z17.0 MALIGNANT NEOPLASM OF UPPER-OUTER QUADRANT OF RIGHT BREAST IN FEMALE, ESTROGEN RECEPTOR POSITIVE (HCC): Primary | ICD-10-CM

## 2018-10-18 DIAGNOSIS — M81.8 OTHER OSTEOPOROSIS WITHOUT CURRENT PATHOLOGICAL FRACTURE: ICD-10-CM

## 2018-10-18 DIAGNOSIS — C50.411 MALIGNANT NEOPLASM OF UPPER-OUTER QUADRANT OF RIGHT BREAST IN FEMALE, ESTROGEN RECEPTOR POSITIVE (HCC): Primary | ICD-10-CM

## 2018-10-18 PROCEDURE — 99214 OFFICE O/P EST MOD 30 MIN: CPT | Performed by: INTERNAL MEDICINE

## 2018-10-18 NOTE — PROGRESS NOTES
HPI:   Follow-up visit for right breast cancer that was diagnosed in 2013, hormone receptor positive, HER2 negative, grade 2, stage IIA, T2 N0  patient had right mastectomy and sentinel lymph node sampling  Oncotype score was low at 12  In September 2013 patient was started on adjuvant Femara  She has minor musculoskeletal symptoms and some thinning of hair secondary to Femara  She has borderline osteoporosis on DEXA scan  She is taking vitamin D and she stays active  she will start taking 1 tablet of calcium with or without vitamin-D 3 times a week I did mention about possibility of Prolia and she plans to take information home on Prolia  Previously in 2011 she had melanoma of right ear skin and she had surgery and sentinel lymph node sampling and no adjuvant therapy and there has not been any recurrence  She had Mohs surgery for basal cell skin cancer and squamous cell skin cancer   She had 5-FU cream  She follows with her dermatologist           Current Outpatient Prescriptions:     aspirin-acetaminophen-caffeine (Waylan Boers) 250-250-65 MG per tablet, Take by mouth, Disp: , Rfl:     atorvastatin (LIPITOR) 10 mg tablet, Take 1 tablet by mouth daily, Disp: , Rfl:     Cholecalciferol (VITAMIN D3) 2000 units capsule, Take 2 tablets by mouth, Disp: , Rfl:     DULoxetine (CYMBALTA) 30 mg delayed release capsule, TAKE ONE CAPSULE BY MOUTH DAILY FOR MOOD, Disp: , Rfl:     letrozole (FEMARA) 2 5 mg tablet, Take 1 tablet (2 5 mg total) by mouth daily, Disp: 90 tablet, Rfl: 3    levothyroxine 25 mcg tablet, Take by mouth, Disp: , Rfl:     metoprolol succinate (TOPROL XL) 50 mg 24 hr tablet, Take 50 mg by mouth  , Disp: , Rfl:     No Known Allergies       Malignant neoplasm of upper-outer quadrant of right female breast ([facility] )    6/11/2013 Initial Diagnosis     Malignant neoplasm of upper-outer quadrant of right female breast ([facility] )    Right breast biopsy, 10:00  Pleomorphic lobular carcinoma    ER 90-95%  [place] 60-65%  HER-2 negative         7/10/2013 Surgery     Right mastectomy, sentinel node biopsy (Dr Quang Hidalgo)    Invasive lobular carcinoma  Stage IIa- pT2pN0(+i), grade 2         9/2013 -  Hormone Therapy     Letrozole (Dr Aura Dolan)           Malignant melanoma of head ([facility] )    11/9/2010 Initial Diagnosis     Malignant melanoma of head ([facility] )    Right earlobe biopsy  Malignant melanoma  2 3mm  No ulceration  3 mitosis         1/6/2011 Surgery     Wide excision, sentinel node biopsy- right neck (Dr Quang Hidalgo)    0/8 positive lymph nodes  No residual melanoma on surgical specimen                ROS:  10/18/18 Reviewed 13 systems:  Presently no headaches, seizures, dizziness, diplopia, dysphagia, hoarseness, chest pain, palpitations, shortness of breath, cough, hemoptysis, abdominal pain, nausea, vomiting, change in bowel habits, melena, hematuria, fever, chills, bleeding, bone pains, skin rash, weight loss, tiredness ,  weakness, numbness,  claudication and gait problem  No frequent infections  Not unusually sensitive to heat or cold  No swelling of the ankles  No swollen glands  Patient is anxious  No GYN symptoms Other symptoms are in HPI        /84 (BP Location: Left arm, Patient Position: Sitting, Cuff Size: Adult)   Pulse 77   Temp 97 5 °F (36 4 °C)   Resp 18   Ht 5' 6" (1 676 m)   Wt 73 2 kg (161 lb 6 4 oz)   SpO2 97%   BMI 26 05 kg/m²     Physical Exam:  Nurse Caden Rueda was in the room during examination  Alert, oriented, not in distress, no icterus, no oral thrush, no palpable neck mass, clear lung fields, regular heart rate, abdomen  soft and non tender, no palpable abdominal mass, no ascites, no edema of ankles, no calf tenderness, no focal neurological deficit, no skin rash, no palpable lymphadenopathy  in the neck and axillary areas, good arterial pulses, no clubbing  Patient is anxious     Right mastectomy scar  No lymphedema  Performance status 0      IMAGING:   Bone density -2 5    LABS:  Results for orders placed or performed in visit on 10/05/18   Cancer antigen 27 29   Result Value Ref Range    CA 27 29 24 6 0 0 - 42 3 U/mL   CBC and differential   Result Value Ref Range    WBC 5 05 4 31 - 10 16 Thousand/uL    RBC 4 44 3 81 - 5 12 Million/uL    Hemoglobin 14 2 11 5 - 15 4 g/dL    Hematocrit 41 8 34 8 - 46 1 %    MCV 94 82 - 98 fL    MCH 32 0 26 8 - 34 3 pg    MCHC 34 0 31 4 - 37 4 g/dL    RDW 14 5 11 6 - 15 1 %    MPV 12 3 8 9 - 12 7 fL    Platelets 160 098 - 747 Thousands/uL    nRBC 0 /100 WBCs    Neutrophils Relative 44 43 - 75 %    Immat GRANS % 0 0 - 2 %    Lymphocytes Relative 43 14 - 44 %    Monocytes Relative 10 4 - 12 %    Eosinophils Relative 2 0 - 6 %    Basophils Relative 1 0 - 1 %    Neutrophils Absolute 2 20 1 85 - 7 62 Thousands/µL    Immature Grans Absolute 0 02 0 00 - 0 20 Thousand/uL    Lymphocytes Absolute 2 19 0 60 - 4 47 Thousands/µL    Monocytes Absolute 0 52 0 17 - 1 22 Thousand/µL    Eosinophils Absolute 0 08 0 00 - 0 61 Thousand/µL    Basophils Absolute 0 04 0 00 - 0 10 Thousands/µL   Comprehensive metabolic panel   Result Value Ref Range    Sodium 138 136 - 145 mmol/L    Potassium 4 6 3 5 - 5 3 mmol/L    Chloride 104 100 - 108 mmol/L    CO2 28 21 - 32 mmol/L    ANION GAP 6 4 - 13 mmol/L    BUN 18 5 - 25 mg/dL    Creatinine 0 78 0 60 - 1 30 mg/dL    Glucose, Fasting 102 (H) 65 - 99 mg/dL    Calcium 10 0 8 3 - 10 1 mg/dL    AST 11 5 - 45 U/L    ALT 27 12 - 78 U/L    Alkaline Phosphatase 121 (H) 46 - 116 U/L    Total Protein 7 8 6 4 - 8 2 g/dL    Albumin 3 9 3 5 - 5 0 g/dL    Total Bilirubin 0 93 0 20 - 1 00 mg/dL    eGFR 76 ml/min/1 73sq m     Labs, Imaging, & Other studies:   All pertinent labs and imaging studies were personally reviewed      Reviewed and discussed with patient  Assessment and plan:   Follow-up visit for right breast cancer that was diagnosed in 2013, hormone receptor positive, HER2 negative, grade 2, stage IIA, T2 N0  patient had right mastectomy and sentinel lymph node sampling  Oncotype score was low at 12  In September 2013 patient was started on adjuvant Femara  She has minor musculoskeletal symptoms and some thinning of hair secondary to Femara  She has borderline osteoporosis on DEXA scan  She is taking vitamin D and she stays active  she will start taking 1 tablet of calcium with or without vitamin-D 3 times a week I did mention about possibility of Prolia and she plans to take information home on Prolia  Previously in 2011 she had melanoma of right ear skin and she had surgery and sentinel lymph node sampling and no adjuvant therapy and there has not been any recurrence  She had Mohs surgery for basal cell skin cancer and squamous cell skin cancer  She had 5-FU cream  She follows with her dermatologist   Physical examination and test results are as recorded and discussed  She remains in remission from breast cancer  She stays on letrozole     Vitamin-D and calcium as above  Goal is cure from breast cancer  Condition and plan discussed  Questions answered  Discussed the importance of self-breast examination, eating healthy foods and exercises as tolerated and also health screening tests   Patient is self care  Discussed Prolia with the patient  She is not sure if she will take it  She will be taking information home on Prolia  1  Malignant neoplasm of upper-outer quadrant of right breast in female, estrogen receptor positive ([facility] [address] )    - Cancer antigen 27 29; Future  - CBC and differential; Future  - Comprehensive metabolic panel; Future    2  Other osteoporosis without current pathological fracture          Patient voiced understanding and agreement in the discussion  Counseling / Coordination of Care   Greater than 50% of total time was spent with the patient and / or family counseling and / or coordination of care

## 2018-10-18 NOTE — PATIENT INSTRUCTIONS
Information to take home on Prolia  Can restart calcium 3 days a week, 500-600 mg with her without vitamin-D  Continuing on letrozole

## 2018-10-18 NOTE — LETTER
October 18, 2018     Sandy Joyce, [address] [facility] [place]  [address] [facility] [address]    Patient: Joce Buckley   YOB: 1945   Date of Visit: 10/18/2018       Dear Dr Bryant Haque: Thank you for referring Royden Closs to me for evaluation  Below are my notes for this consultation  If you have questions, please do not hesitate to call me  I look forward to following your patient along with you  Sincerely,        Deana Harvey MD        CC: No Recipients  Deana Harvey MD  10/18/2018 11:47 AM  Sign at close encounter    HPI:   Follow-up visit for right breast cancer that was diagnosed in 2013, hormone receptor positive, HER2 negative, grade 2, stage IIA, T2 N0  patient had right mastectomy and sentinel lymph node sampling  Oncotype score was low at 12  In September 2013 patient was started on adjuvant Femara  She has minor musculoskeletal symptoms and some thinning of hair secondary to Femara  She has borderline osteoporosis on DEXA scan  She is taking vitamin D and she stays active  she will start taking 1 tablet of calcium with or without vitamin-D 3 times a week I did mention about possibility of Prolia and she plans to take information home on Prolia  Previously in 2011 she had melanoma of right ear skin and she had surgery and sentinel lymph node sampling and no adjuvant therapy and there has not been any recurrence  She had Mohs surgery for basal cell skin cancer and squamous cell skin cancer   She had 5-FU cream  She follows with her dermatologist           Current Outpatient Prescriptions:     aspirin-acetaminophen-caffeine (Jamse Mowers) 250-250-65 MG per tablet, Take by mouth, Disp: , Rfl:     atorvastatin (LIPITOR) 10 mg tablet, Take 1 tablet by mouth daily, Disp: , Rfl:     Cholecalciferol (VITAMIN D3) 2000 units capsule, Take 2 tablets by mouth, Disp: , Rfl:     DULoxetine (CYMBALTA) 30 mg delayed release capsule, TAKE ONE CAPSULE BY MOUTH DAILY FOR MOOD, Disp: , Rfl:     letrozole (FEMARA) 2 5 mg tablet, Take 1 tablet (2 5 mg total) by mouth daily, Disp: 90 tablet, Rfl: 3    levothyroxine 25 mcg tablet, Take by mouth, Disp: , Rfl:     metoprolol succinate (TOPROL XL) 50 mg 24 hr tablet, Take 50 mg by mouth  , Disp: , Rfl:     No Known Allergies       [facility] )    6/11/2013 Initial Diagnosis     Malignant neoplasm of upper-outer quadrant of right female breast ([facility])    Right breast biopsy, 10:00  Pleomorphic lobular carcinoma    ER 90-95%  OK 60-65%  HER-2 negative         7/10/2013 Surgery     Right mastectomy, sentinel node biopsy (Dr Demetrius Weinstein)    Invasive lobular carcinoma  Stage IIa- pT2pN0(+i), grade 2         9/2013 -  Hormone Therapy     Letrozole (Dr Oliva Vargas)           Malignant melanoma of head ([facility] )    11/9/2010 Initial Diagnosis     Malignant melanoma of head ([place] [address] )    Right earlobe biopsy  Malignant melanoma  2 3mm  No ulceration  3 mitosis         1/6/2011 Surgery     Wide excision, sentinel node biopsy- right neck (Dr Demetrius Weinstein)    0/8 positive lymph nodes  No residual melanoma on surgical specimen                ROS:  10/18/18 Reviewed 13 systems:  Presently no headaches, seizures, dizziness, diplopia, dysphagia, hoarseness, chest pain, palpitations, shortness of breath, cough, hemoptysis, abdominal pain, nausea, vomiting, change in bowel habits, melena, hematuria, fever, chills, bleeding, bone pains, skin rash, weight loss, tiredness ,  weakness, numbness,  claudication and gait problem  No frequent infections  Not unusually sensitive to heat or cold  No swelling of the ankles  No swollen glands  Patient is anxious  No GYN symptoms Other symptoms are in HPI        /84 (BP Location: Left arm, Patient Position: Sitting, Cuff Size: Adult)   Pulse 77   Temp 97 5 °F (36 4 °C)   Resp 18   Ht 5' 6" (1 676 m)   Wt 73 2 kg (161 lb 6 4 oz)   SpO2 97%   BMI 26 05 kg/m²      Physical Exam:  Nurse Alfredo Cleaning was in the room during examination  Alert, oriented, not in distress, no icterus, no oral thrush, no palpable neck mass, clear lung fields, regular heart rate, abdomen  soft and non tender, no palpable abdominal mass, no ascites, no edema of ankles, no calf tenderness, no focal neurological deficit, no skin rash, no palpable lymphadenopathy  in the neck and axillary areas, good arterial pulses, no clubbing  Patient is anxious     Right mastectomy scar  No lymphedema  Performance status 0      IMAGING:   Bone density -2 5    LABS:  Results for orders placed or performed in visit on 10/05/18   Cancer antigen 27 29   Result Value Ref Range    CA 27 29 24 6 0 0 - 42 3 U/mL   CBC and differential   Result Value Ref Range    WBC 5 05 4 31 - 10 16 Thousand/uL    RBC 4 44 3 81 - 5 12 Million/uL    Hemoglobin 14 2 11 5 - 15 4 g/dL    Hematocrit 41 8 34 8 - 46 1 %    MCV 94 82 - 98 fL    MCH 32 0 26 8 - 34 3 pg    MCHC 34 0 31 4 - 37 4 g/dL    RDW 14 5 11 6 - 15 1 %    MPV 12 3 8 9 - 12 7 fL    Platelets 414 535 - 442 Thousands/uL    nRBC 0 /100 WBCs    Neutrophils Relative 44 43 - 75 %    Immat GRANS % 0 0 - 2 %    Lymphocytes Relative 43 14 - 44 %    Monocytes Relative 10 4 - 12 %    Eosinophils Relative 2 0 - 6 %    Basophils Relative 1 0 - 1 %    Neutrophils Absolute 2 20 1 85 - 7 62 Thousands/µL    Immature Grans Absolute 0 02 0 00 - 0 20 Thousand/uL    Lymphocytes Absolute 2 19 0 60 - 4 47 Thousands/µL    Monocytes Absolute 0 52 0 17 - 1 22 Thousand/µL    Eosinophils Absolute 0 08 0 00 - 0 61 Thousand/µL    Basophils Absolute 0 04 0 00 - 0 10 Thousands/µL   Comprehensive metabolic panel   Result Value Ref Range    Sodium 138 136 - 145 mmol/L    Potassium 4 6 3 5 - 5 3 mmol/L    Chloride 104 100 - 108 mmol/L    CO2 28 21 - 32 mmol/L    ANION GAP 6 4 - 13 mmol/L    BUN 18 5 - 25 mg/dL    Creatinine 0 78 0 60 - 1 30 mg/dL    Glucose, Fasting 102 (H) 65 - 99 mg/dL    Calcium 10 0 8 3 - 10 1 mg/dL    AST 11 5 - 45 U/L    ALT 27 12 - 78 U/L    Alkaline Phosphatase 121 (H) 46 - 116 U/L    Total Protein 7 8 6 4 - 8 2 g/dL    Albumin 3 9 3 5 - 5 0 g/dL    Total Bilirubin 0 93 0 20 - 1 00 mg/dL    eGFR 76 ml/min/1 73sq m     Labs, Imaging, & Other studies:   All pertinent labs and imaging studies were personally reviewed      Reviewed and discussed with patient  Assessment and plan: Follow-up visit for right breast cancer that was diagnosed in 2013, hormone receptor positive, HER2 negative, grade 2, stage IIA, T2 N0  patient had right mastectomy and sentinel lymph node sampling  Oncotype score was low at 12  In September 2013 patient was started on adjuvant Femara  She has minor musculoskeletal symptoms and some thinning of hair secondary to Femara  She has borderline osteoporosis on DEXA scan  She is taking vitamin D and she stays active  she will start taking 1 tablet of calcium with or without vitamin-D 3 times a week I did mention about possibility of Prolia and she plans to take information home on Prolia  Previously in 2011 she had melanoma of right ear skin and she had surgery and sentinel lymph node sampling and no adjuvant therapy and there has not been any recurrence  She had Mohs surgery for basal cell skin cancer and squamous cell skin cancer  She had 5-FU cream  She follows with her dermatologist   Physical examination and test results are as recorded and discussed  She remains in remission from breast cancer  She stays on letrozole     Vitamin-D and calcium as above  Goal is cure from breast cancer  Condition and plan discussed  Questions answered  Discussed the importance of self-breast examination, eating healthy foods and exercises as tolerated and also health screening tests   Patient is self care  Discussed Prolia with the patient  She is not sure if she will take it  She will be taking information home on Prolia      1  Malignant neoplasm of upper-outer quadrant of right breast in female, estrogen receptor positive ([facility] )    - Cancer antigen 27 29; Future  - CBC and differential; Future  - Comprehensive metabolic panel; Future    2  Other osteoporosis without current pathological fracture          Patient voiced understanding and agreement in the discussion  Counseling / Coordination of Care   Greater than 50% of total time was spent with the patient and / or family counseling and / or coordination of care

## 2019-04-01 ENCOUNTER — APPOINTMENT (OUTPATIENT)
Dept: LAB | Facility: CLINIC | Age: 74
End: 2019-04-01
Payer: MEDICARE

## 2019-04-01 DIAGNOSIS — Z17.0 MALIGNANT NEOPLASM OF UPPER-OUTER QUADRANT OF RIGHT BREAST IN FEMALE, ESTROGEN RECEPTOR POSITIVE (HCC): ICD-10-CM

## 2019-04-01 DIAGNOSIS — C50.411 MALIGNANT NEOPLASM OF UPPER-OUTER QUADRANT OF RIGHT BREAST IN FEMALE, ESTROGEN RECEPTOR POSITIVE (HCC): ICD-10-CM

## 2019-04-01 LAB
ALBUMIN SERPL BCP-MCNC: 3.9 G/DL (ref 3.5–5)
ALP SERPL-CCNC: 117 U/L (ref 46–116)
ALT SERPL W P-5'-P-CCNC: 29 U/L (ref 12–78)
ANION GAP SERPL CALCULATED.3IONS-SCNC: 1 MMOL/L (ref 4–13)
AST SERPL W P-5'-P-CCNC: 14 U/L (ref 5–45)
BASOPHILS # BLD AUTO: 0.04 THOUSANDS/ΜL (ref 0–0.1)
BASOPHILS NFR BLD AUTO: 1 % (ref 0–1)
BILIRUB SERPL-MCNC: 0.52 MG/DL (ref 0.2–1)
BUN SERPL-MCNC: 20 MG/DL (ref 5–25)
CALCIUM SERPL-MCNC: 9.7 MG/DL (ref 8.3–10.1)
CANCER AG27-29 SERPL-ACNC: 26.6 U/ML (ref 0–42.3)
CHLORIDE SERPL-SCNC: 108 MMOL/L (ref 100–108)
CO2 SERPL-SCNC: 29 MMOL/L (ref 21–32)
CREAT SERPL-MCNC: 0.76 MG/DL (ref 0.6–1.3)
EOSINOPHIL # BLD AUTO: 0.1 THOUSAND/ΜL (ref 0–0.61)
EOSINOPHIL NFR BLD AUTO: 2 % (ref 0–6)
ERYTHROCYTE [DISTWIDTH] IN BLOOD BY AUTOMATED COUNT: 14.6 % (ref 11.6–15.1)
GFR SERPL CREATININE-BSD FRML MDRD: 79 ML/MIN/1.73SQ M
GLUCOSE P FAST SERPL-MCNC: 100 MG/DL (ref 65–99)
HCT VFR BLD AUTO: 40.4 % (ref 34.8–46.1)
HGB BLD-MCNC: 13.2 G/DL (ref 11.5–15.4)
IMM GRANULOCYTES # BLD AUTO: 0.02 THOUSAND/UL (ref 0–0.2)
IMM GRANULOCYTES NFR BLD AUTO: 1 % (ref 0–2)
LYMPHOCYTES # BLD AUTO: 1.19 THOUSANDS/ΜL (ref 0.6–4.47)
LYMPHOCYTES NFR BLD AUTO: 27 % (ref 14–44)
MCH RBC QN AUTO: 31 PG (ref 26.8–34.3)
MCHC RBC AUTO-ENTMCNC: 32.7 G/DL (ref 31.4–37.4)
MCV RBC AUTO: 95 FL (ref 82–98)
MONOCYTES # BLD AUTO: 0.48 THOUSAND/ΜL (ref 0.17–1.22)
MONOCYTES NFR BLD AUTO: 11 % (ref 4–12)
NEUTROPHILS # BLD AUTO: 2.52 THOUSANDS/ΜL (ref 1.85–7.62)
NEUTS SEG NFR BLD AUTO: 58 % (ref 43–75)
NRBC BLD AUTO-RTO: 0 /100 WBCS
PLATELET # BLD AUTO: 205 THOUSANDS/UL (ref 149–390)
PMV BLD AUTO: 12.2 FL (ref 8.9–12.7)
POTASSIUM SERPL-SCNC: 4.4 MMOL/L (ref 3.5–5.3)
PROT SERPL-MCNC: 7.4 G/DL (ref 6.4–8.2)
RBC # BLD AUTO: 4.26 MILLION/UL (ref 3.81–5.12)
SODIUM SERPL-SCNC: 138 MMOL/L (ref 136–145)
WBC # BLD AUTO: 4.35 THOUSAND/UL (ref 4.31–10.16)

## 2019-04-01 PROCEDURE — 86300 IMMUNOASSAY TUMOR CA 15-3: CPT

## 2019-04-01 PROCEDURE — 80053 COMPREHEN METABOLIC PANEL: CPT

## 2019-04-01 PROCEDURE — 85025 COMPLETE CBC W/AUTO DIFF WBC: CPT

## 2019-04-01 PROCEDURE — 36415 COLL VENOUS BLD VENIPUNCTURE: CPT

## 2019-04-04 ENCOUNTER — TELEPHONE (OUTPATIENT)
Dept: HEMATOLOGY ONCOLOGY | Facility: CLINIC | Age: 74
End: 2019-04-04

## 2019-04-18 ENCOUNTER — OFFICE VISIT (OUTPATIENT)
Dept: HEMATOLOGY ONCOLOGY | Facility: CLINIC | Age: 74
End: 2019-04-18
Payer: MEDICARE

## 2019-04-18 VITALS
BODY MASS INDEX: 25.55 KG/M2 | HEART RATE: 86 BPM | SYSTOLIC BLOOD PRESSURE: 144 MMHG | WEIGHT: 159 LBS | HEIGHT: 66 IN | RESPIRATION RATE: 16 BRPM | DIASTOLIC BLOOD PRESSURE: 76 MMHG | TEMPERATURE: 98 F

## 2019-04-18 DIAGNOSIS — C50.411 MALIGNANT NEOPLASM OF UPPER-OUTER QUADRANT OF RIGHT BREAST IN FEMALE, ESTROGEN RECEPTOR POSITIVE (HCC): ICD-10-CM

## 2019-04-18 DIAGNOSIS — Z17.0 MALIGNANT NEOPLASM OF UPPER-OUTER QUADRANT OF RIGHT BREAST IN FEMALE, ESTROGEN RECEPTOR POSITIVE (HCC): ICD-10-CM

## 2019-04-18 PROCEDURE — 99214 OFFICE O/P EST MOD 30 MIN: CPT | Performed by: PHYSICIAN ASSISTANT

## 2019-04-18 RX ORDER — LETROZOLE 2.5 MG/1
2.5 TABLET, FILM COATED ORAL DAILY
Qty: 90 TABLET | Refills: 3 | Status: SHIPPED | OUTPATIENT
Start: 2019-04-18

## 2019-07-03 ENCOUNTER — HOSPITAL ENCOUNTER (OUTPATIENT)
Dept: MAMMOGRAPHY | Facility: HOSPITAL | Age: 74
Discharge: HOME | End: 2019-07-03
Payer: MEDICARE

## 2019-07-03 VITALS — HEIGHT: 66 IN | BODY MASS INDEX: 25.55 KG/M2 | WEIGHT: 159 LBS

## 2019-07-03 DIAGNOSIS — Z12.31 VISIT FOR SCREENING MAMMOGRAM: ICD-10-CM

## 2019-07-03 PROCEDURE — 77063 BREAST TOMOSYNTHESIS BI: CPT

## 2019-07-03 PROCEDURE — 77067 SCR MAMMO BI INCL CAD: CPT
